# Patient Record
Sex: MALE | Race: WHITE | Employment: FULL TIME | URBAN - METROPOLITAN AREA
[De-identification: names, ages, dates, MRNs, and addresses within clinical notes are randomized per-mention and may not be internally consistent; named-entity substitution may affect disease eponyms.]

---

## 2021-06-17 ENCOUNTER — HOSPITAL ENCOUNTER (EMERGENCY)
Facility: CLINIC | Age: 41
Discharge: HOME | End: 2021-06-17
Attending: EMERGENCY MEDICINE
Payer: COMMERCIAL

## 2021-06-17 ENCOUNTER — APPOINTMENT (OUTPATIENT)
Dept: CT IMAGING | Facility: CLINIC | Age: 41
End: 2021-06-17
Payer: COMMERCIAL

## 2021-06-17 VITALS
RESPIRATION RATE: 18 BRPM | HEART RATE: 68 BPM | SYSTOLIC BLOOD PRESSURE: 143 MMHG | DIASTOLIC BLOOD PRESSURE: 104 MMHG | OXYGEN SATURATION: 98 % | TEMPERATURE: 98.3 F

## 2021-06-17 DIAGNOSIS — S09.90XA CLOSED HEAD INJURY, INITIAL ENCOUNTER: Primary | ICD-10-CM

## 2021-06-17 PROCEDURE — 70450 CT HEAD/BRAIN W/O DYE: CPT

## 2021-06-17 PROCEDURE — 99283 EMERGENCY DEPT VISIT LOW MDM: CPT

## 2021-06-17 PROCEDURE — 96372 THER/PROPH/DIAG INJ SC/IM: CPT

## 2021-06-17 PROCEDURE — 6360000002 HC RX W HCPCS: Performed by: EMERGENCY MEDICINE

## 2021-06-17 RX ORDER — KETOROLAC TROMETHAMINE 30 MG/ML
30 INJECTION, SOLUTION INTRAMUSCULAR; INTRAVENOUS ONCE
Status: COMPLETED | OUTPATIENT
Start: 2021-06-17 | End: 2021-06-17

## 2021-06-17 RX ADMIN — KETOROLAC TROMETHAMINE 30 MG: 30 INJECTION, SOLUTION INTRAMUSCULAR at 13:31

## 2021-06-17 ASSESSMENT — ENCOUNTER SYMPTOMS
DIARRHEA: 0
SORE THROAT: 0
VOMITING: 0
SHORTNESS OF BREATH: 0

## 2021-06-17 ASSESSMENT — PAIN SCALES - GENERAL
PAINLEVEL_OUTOF10: 8
PAINLEVEL_OUTOF10: 8

## 2021-06-17 ASSESSMENT — PAIN DESCRIPTION - LOCATION: LOCATION: HEAD

## 2021-06-17 ASSESSMENT — PAIN DESCRIPTION - PAIN TYPE: TYPE: ACUTE PAIN
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
## 2025-05-20 ENCOUNTER — OFFICE VISIT (OUTPATIENT)
Age: 45
End: 2025-05-20

## 2025-05-20 VITALS
DIASTOLIC BLOOD PRESSURE: 79 MMHG | OXYGEN SATURATION: 97 % | WEIGHT: 170 LBS | SYSTOLIC BLOOD PRESSURE: 129 MMHG | RESPIRATION RATE: 14 BRPM | TEMPERATURE: 98.4 F | HEART RATE: 98 BPM | HEIGHT: 69 IN | BODY MASS INDEX: 25.18 KG/M2

## 2025-05-20 DIAGNOSIS — M76.892 TENDINITIS OF LEFT KNEE: ICD-10-CM

## 2025-05-20 DIAGNOSIS — S83.92XA SPRAIN OF LEFT KNEE, UNSPECIFIED LIGAMENT, INITIAL ENCOUNTER: Primary | ICD-10-CM

## 2025-05-20 RX ORDER — IBUPROFEN 600 MG/1
600 TABLET, FILM COATED ORAL 3 TIMES DAILY PRN
Qty: 30 TABLET | Refills: 0 | Status: SHIPPED | OUTPATIENT
Start: 2025-05-20

## 2025-05-20 RX ORDER — NAPROXEN 500 MG/1
500 TABLET ORAL 2 TIMES DAILY WITH MEALS
COMMUNITY
Start: 2025-03-26

## 2025-05-20 RX ORDER — LISINOPRIL 5 MG/1
5 TABLET ORAL DAILY
COMMUNITY
Start: 2025-03-26

## 2025-05-20 RX ORDER — CETIRIZINE HYDROCHLORIDE 10 MG/1
10 TABLET, CHEWABLE ORAL DAILY
COMMUNITY

## 2025-05-20 ASSESSMENT — ENCOUNTER SYMPTOMS
NAUSEA: 0
TROUBLE SWALLOWING: 0
EYE DISCHARGE: 0
FACIAL SWELLING: 0
EYE PAIN: 0
COUGH: 0
VOMITING: 0
SORE THROAT: 0
ABDOMINAL PAIN: 0
EYE REDNESS: 0
ABDOMINAL DISTENTION: 0
CHEST TIGHTNESS: 0
SHORTNESS OF BREATH: 0
VOICE CHANGE: 0
CONSTIPATION: 0
DIARRHEA: 0
BACK PAIN: 0
COLOR CHANGE: 0

## 2025-05-20 NOTE — PROGRESS NOTES
3 times daily    ibuprofen (ADVIL;MOTRIN) 600 MG tablet     Sig: Take 1 tablet by mouth 3 times daily as needed for Pain     Dispense:  30 tablet     Refill:  0            Medical Decision Making     Left knee sprain versus tendinitis.  X-ray is not warranted based on physical exam.  Home on ibuprofen.  R-I-C-E therapy.\"Based on the patient's current clinical status and physical examination findings, the patient is stable and can be safely discharged with prescribed medications and close outpatient follow-up.\"I Discussed physical exam findings , diagnosis, plan of care, and follow-up at length and in details with patient.  I Reviewed prescribed and recommended medications, administration, and side effects.         Discharge instructions:     - R-I-C-E therapy   - Rest. Advance activities and diet as tolerated.  - Take the prescribed medications as instructed.  - Follow up with your PCP as instructed.   - Return to the Urgent care or go to the closest ER if your symptoms persist, worsen or any other concerns. Patient verbalized understanding.    An electronic signature was used to authenticate this note.    --PEGGY Zavala - CNP

## 2025-06-11 ENCOUNTER — APPOINTMENT (OUTPATIENT)
Dept: CT IMAGING | Age: 45
End: 2025-06-11
Payer: COMMERCIAL

## 2025-06-11 ENCOUNTER — HOSPITAL ENCOUNTER (EMERGENCY)
Age: 45
Discharge: HOME | End: 2025-06-11
Attending: EMERGENCY MEDICINE
Payer: COMMERCIAL

## 2025-06-11 VITALS
TEMPERATURE: 98.1 F | SYSTOLIC BLOOD PRESSURE: 115 MMHG | RESPIRATION RATE: 16 BRPM | OXYGEN SATURATION: 97 % | DIASTOLIC BLOOD PRESSURE: 83 MMHG | HEART RATE: 61 BPM

## 2025-06-11 DIAGNOSIS — R19.7 NAUSEA VOMITING AND DIARRHEA: Primary | ICD-10-CM

## 2025-06-11 DIAGNOSIS — R11.2 NAUSEA VOMITING AND DIARRHEA: Primary | ICD-10-CM

## 2025-06-11 LAB
ALBUMIN SERPL-MCNC: 4 G/DL (ref 3.5–5.2)
ALBUMIN/GLOB SERPL: 1.7 {RATIO} (ref 1–2.5)
ALP SERPL-CCNC: 71 U/L (ref 40–129)
ALT SERPL-CCNC: 23 U/L (ref 10–50)
ANION GAP SERPL CALCULATED.3IONS-SCNC: 13 MMOL/L (ref 9–16)
AST SERPL-CCNC: 24 U/L (ref 10–50)
BASOPHILS # BLD: 0.03 K/UL (ref 0–0.2)
BASOPHILS NFR BLD: 0 % (ref 0–2)
BILIRUB SERPL-MCNC: 0.9 MG/DL (ref 0–1.2)
BUN SERPL-MCNC: 14 MG/DL (ref 6–20)
CALCIUM SERPL-MCNC: 9.1 MG/DL (ref 8.6–10.4)
CHLORIDE SERPL-SCNC: 99 MMOL/L (ref 98–107)
CO2 SERPL-SCNC: 23 MMOL/L (ref 20–31)
CREAT SERPL-MCNC: 0.9 MG/DL (ref 0.7–1.2)
EOSINOPHIL # BLD: <0.03 K/UL (ref 0–0.44)
EOSINOPHILS RELATIVE PERCENT: 0 % (ref 1–4)
ERYTHROCYTE [DISTWIDTH] IN BLOOD BY AUTOMATED COUNT: 12.6 % (ref 11.8–14.4)
GFR, ESTIMATED: >90 ML/MIN/1.73M2
GLUCOSE SERPL-MCNC: 106 MG/DL (ref 74–99)
HCT VFR BLD AUTO: 39.6 % (ref 40.7–50.3)
HGB BLD-MCNC: 14.3 G/DL (ref 13–17)
IMM GRANULOCYTES # BLD AUTO: 0.03 K/UL (ref 0–0.3)
IMM GRANULOCYTES NFR BLD: 0 %
LACTIC ACID, WHOLE BLOOD: 1.3 MMOL/L (ref 0.7–2.1)
LIPASE SERPL-CCNC: 34 U/L (ref 13–60)
LYMPHOCYTES NFR BLD: 1.17 K/UL (ref 1.1–3.7)
LYMPHOCYTES RELATIVE PERCENT: 15 % (ref 24–43)
MAGNESIUM SERPL-MCNC: 2.1 MG/DL (ref 1.6–2.6)
MCH RBC QN AUTO: 31.8 PG (ref 25.2–33.5)
MCHC RBC AUTO-ENTMCNC: 36.1 G/DL (ref 28.4–34.8)
MCV RBC AUTO: 88.2 FL (ref 82.6–102.9)
MONOCYTES NFR BLD: 0.75 K/UL (ref 0.1–1.2)
MONOCYTES NFR BLD: 10 % (ref 3–12)
NEUTROPHILS NFR BLD: 75 % (ref 36–65)
NEUTS SEG NFR BLD: 5.87 K/UL (ref 1.5–8.1)
NRBC BLD-RTO: 0 PER 100 WBC
PLATELET # BLD AUTO: 201 K/UL (ref 138–453)
PMV BLD AUTO: 11.1 FL (ref 8.1–13.5)
POTASSIUM SERPL-SCNC: 3.7 MMOL/L (ref 3.7–5.3)
PROT SERPL-MCNC: 6.4 G/DL (ref 6.6–8.7)
RBC # BLD AUTO: 4.49 M/UL (ref 4.21–5.77)
SODIUM SERPL-SCNC: 135 MMOL/L (ref 136–145)
WBC OTHER # BLD: 7.9 K/UL (ref 3.5–11.3)

## 2025-06-11 PROCEDURE — 85025 COMPLETE CBC W/AUTO DIFF WBC: CPT

## 2025-06-11 PROCEDURE — 6360000002 HC RX W HCPCS: Performed by: EMERGENCY MEDICINE

## 2025-06-11 PROCEDURE — 6360000004 HC RX CONTRAST MEDICATION: Performed by: STUDENT IN AN ORGANIZED HEALTH CARE EDUCATION/TRAINING PROGRAM

## 2025-06-11 PROCEDURE — 2580000003 HC RX 258: Performed by: STUDENT IN AN ORGANIZED HEALTH CARE EDUCATION/TRAINING PROGRAM

## 2025-06-11 PROCEDURE — 2500000003 HC RX 250 WO HCPCS: Performed by: EMERGENCY MEDICINE

## 2025-06-11 PROCEDURE — 6360000002 HC RX W HCPCS: Performed by: STUDENT IN AN ORGANIZED HEALTH CARE EDUCATION/TRAINING PROGRAM

## 2025-06-11 PROCEDURE — 83735 ASSAY OF MAGNESIUM: CPT

## 2025-06-11 PROCEDURE — 96374 THER/PROPH/DIAG INJ IV PUSH: CPT

## 2025-06-11 PROCEDURE — 96375 TX/PRO/DX INJ NEW DRUG ADDON: CPT

## 2025-06-11 PROCEDURE — 96376 TX/PRO/DX INJ SAME DRUG ADON: CPT

## 2025-06-11 PROCEDURE — 99281 EMR DPT VST MAYX REQ PHY/QHP: CPT | Performed by: PHYSICIAN ASSISTANT

## 2025-06-11 PROCEDURE — 83605 ASSAY OF LACTIC ACID: CPT

## 2025-06-11 PROCEDURE — 74177 CT ABD & PELVIS W/CONTRAST: CPT

## 2025-06-11 PROCEDURE — 99285 EMERGENCY DEPT VISIT HI MDM: CPT

## 2025-06-11 PROCEDURE — 83690 ASSAY OF LIPASE: CPT

## 2025-06-11 PROCEDURE — 80053 COMPREHEN METABOLIC PANEL: CPT

## 2025-06-11 PROCEDURE — 6370000000 HC RX 637 (ALT 250 FOR IP): Performed by: EMERGENCY MEDICINE

## 2025-06-11 RX ORDER — MAGNESIUM HYDROXIDE/ALUMINUM HYDROXICE/SIMETHICONE 120; 1200; 1200 MG/30ML; MG/30ML; MG/30ML
30 SUSPENSION ORAL ONCE
Status: COMPLETED | OUTPATIENT
Start: 2025-06-11 | End: 2025-06-11

## 2025-06-11 RX ORDER — DIPHENHYDRAMINE HYDROCHLORIDE 50 MG/ML
25 INJECTION, SOLUTION INTRAMUSCULAR; INTRAVENOUS ONCE
Status: COMPLETED | OUTPATIENT
Start: 2025-06-11 | End: 2025-06-11

## 2025-06-11 RX ORDER — 0.9 % SODIUM CHLORIDE 0.9 %
1000 INTRAVENOUS SOLUTION INTRAVENOUS ONCE
Status: COMPLETED | OUTPATIENT
Start: 2025-06-11 | End: 2025-06-11

## 2025-06-11 RX ORDER — MORPHINE SULFATE 4 MG/ML
4 INJECTION INTRAVENOUS ONCE
Refills: 0 | Status: COMPLETED | OUTPATIENT
Start: 2025-06-11 | End: 2025-06-11

## 2025-06-11 RX ORDER — ONDANSETRON 4 MG/1
4 TABLET, ORALLY DISINTEGRATING ORAL 3 TIMES DAILY PRN
Qty: 6 TABLET | Refills: 0 | Status: SHIPPED | OUTPATIENT
Start: 2025-06-11

## 2025-06-11 RX ORDER — PROCHLORPERAZINE EDISYLATE 5 MG/ML
10 INJECTION INTRAMUSCULAR; INTRAVENOUS ONCE
Status: COMPLETED | OUTPATIENT
Start: 2025-06-11 | End: 2025-06-11

## 2025-06-11 RX ORDER — IOPAMIDOL 755 MG/ML
75 INJECTION, SOLUTION INTRAVASCULAR
Status: COMPLETED | OUTPATIENT
Start: 2025-06-11 | End: 2025-06-11

## 2025-06-11 RX ORDER — POLYETHYLENE GLYCOL 3350 17 G/17G
17 POWDER, FOR SOLUTION ORAL DAILY PRN
Qty: 10 PACKET | Refills: 0 | Status: SHIPPED | OUTPATIENT
Start: 2025-06-11 | End: 2025-06-21

## 2025-06-11 RX ORDER — MORPHINE SULFATE 4 MG/ML
4 INJECTION INTRAVENOUS ONCE
Status: COMPLETED | OUTPATIENT
Start: 2025-06-11 | End: 2025-06-11

## 2025-06-11 RX ADMIN — SODIUM CHLORIDE 1000 ML: 0.9 INJECTION, SOLUTION INTRAVENOUS at 13:35

## 2025-06-11 RX ADMIN — MORPHINE SULFATE 4 MG: 4 INJECTION INTRAVENOUS at 22:18

## 2025-06-11 RX ADMIN — IOPAMIDOL 75 ML: 755 INJECTION, SOLUTION INTRAVENOUS at 16:24

## 2025-06-11 RX ADMIN — ALUMINUM HYDROXIDE, MAGNESIUM HYDROXIDE, AND SIMETHICONE 30 ML: 200; 200; 20 SUSPENSION ORAL at 18:19

## 2025-06-11 RX ADMIN — DIPHENHYDRAMINE HYDROCHLORIDE 25 MG: 50 INJECTION INTRAMUSCULAR; INTRAVENOUS at 18:24

## 2025-06-11 RX ADMIN — PROCHLORPERAZINE EDISYLATE 10 MG: 5 INJECTION INTRAMUSCULAR; INTRAVENOUS at 18:25

## 2025-06-11 RX ADMIN — MORPHINE SULFATE 4 MG: 4 INJECTION INTRAVENOUS at 13:34

## 2025-06-11 RX ADMIN — FAMOTIDINE 20 MG: 10 INJECTION, SOLUTION INTRAVENOUS at 18:25

## 2025-06-11 ASSESSMENT — PAIN DESCRIPTION - LOCATION: LOCATION: ABDOMEN

## 2025-06-11 ASSESSMENT — PAIN SCALES - GENERAL: PAINLEVEL_OUTOF10: 7

## 2025-06-11 NOTE — ED PROVIDER NOTES
[facility]     Emergency Department     Faculty Attestation  2:03 PM EDT      I performed a history and physical examination of the patient and discussed management with the resident. I have reviewed and agree with the resident’s findings including all diagnostic interpretations, and treatment plans as written. Any areas of disagreement are noted on the chart. I was personally present for the key portions of any procedures. I have documented in the chart those procedures where I was not present during the key portions. I have reviewed the emergency nurses triage note. I agree with the chief complaint, past medical history, past surgical history, allergies, medications, social and family history as documented unless otherwise noted below. Documentation of the HPI, Physical Exam and Medical Decision Making performed by scribbelem is based on my personal performance of the HPI, PE and MDM. For Physician Assistant/ Nurse Practitioner cases/documentation I have personally evaluated this patient and have completed at least one if not all key elements of the E/M (history, physical exam, and MDM). Additional findings are as noted.    Abdominal pain, reports did not have a bowel movement for 2 days, and then has been having vomiting, and diarrhea was seen at urgent care a few days ago did get Zofran and some fluids.  But continues to have abdominal pain and cramping.  On exam he is diffusely tender to his abdomen he does have prior umbilical hernia repair about 20 years ago,    Plan for basic labs, CT imaging, rule out obstruction.    Shanelle Barr D.O, M.P.H  Attending Emergency Medicine Physician         Shanelle Barr, DO  06/11/25 1419       Shanelle Barr, DO  06/11/25 1425

## 2025-06-11 NOTE — ED PROVIDER NOTES
Handoff taken on the following patient from prior Attending Physician:    6:05 PM EDT    Pt Name: Pascual Metz    PCP:  Torin Taylor APRN - CNP         Attestation    I was available and discussed any additional care issues that arose and coordinated the management plans with the resident(s) caring for the patient during my duty period. Any areas of disagreement with resident’s documentation of care or procedures are noted on the chart. I was personally present for the key portions of any/all procedures during my duty period. I have documented in the chart those procedures where I was not present during the key portions.      Patient is a 45-year-old male with nausea vomiting diarrhea and abdominal pain CT abdomen pelvis currently pending however due to multiple patient is awaiting CT there is significant delay for the patient's scan at this time.     Doron Munroe DO  06/11/25 2232

## 2025-06-11 NOTE — ED PROVIDER NOTES
TECHNIQUE: CT of the abdomen and pelvis was performed with the administration of intravenous contrast. Multiplanar reformatted images are provided for review. Automated exposure control, iterative reconstruction, and/or weight based adjustment of the mA/kV was utilized to reduce the radiation dose to as low as reasonably achievable. COMPARISON: None. HISTORY: ORDERING SYSTEM PROVIDED HISTORY: hx obstruction; n/v/d, pain; ro acute process TECHNOLOGIST PROVIDED HISTORY: hx obstruction; n/v/d, pain; ro acute process Decision Support Exception - unselect if not a suspected or confirmed emergency medical condition->Emergency Medical Condition (MA) Reason for Exam: hx obstruction; n/v/d, pain; ro acute process FINDINGS: Lower Chest: Base of lungs are clear.  No pleural effusion or pleural thickening.  No hiatal hernia.  No pneumoperitoneum. Organs: No definable focal abnormality or acute process in liver.  Liver measures 17 cm in craniocaudad dimension. Gallbladder is mildly distended but without stone and without any inflammatory change. Spleen is of generous size measuring 10.5 cm craniocaudad and 12 cm AP. There is no focal abnormality or acute process in spleen. Normal pancreas. Normal bilateral adrenal glands. In the left kidney, there is no evidence of mass, stone or hydronephrosis. In the posterior portion of right mid kidney there is nonenhancing cortical cyst measuring 1.4 cm in diameter.  No recommendation for further follow-up imaging of renal cyst.  No evidence of stone or hydronephrosis in the right kidney. No evidence of ureteric stone or obstruction in either side. No evidence of stone or acute process in the bladder. Prostate is grossly normal with a diameter of 4.1 cm transversely. GI/Bowel: No diagnostic finding in the visualized stomach.  No obvious hiatal hernia.  Small to moderate amount of nonspecific gas scattered in some loops of small bowel in the right side of abdomen without abnormal fluid  discharge. [SD]      ED Course User Index  [SD] Yvette Kang MD  [WK] Doron Munroe DO       OUTSTANDING TASKS / RECOMMENDATIONS:    Follow-up CT scan  Reassessed  Dispo     FINAL IMPRESSION:     1. Nausea vomiting and diarrhea        DISPOSITION:         DISPOSITION:  [x]  Discharge   []  Transfer -    []  Admission -     []  Against Medical Advice   []  Eloped   FOLLOW-UP: Torin Taylor, APRN - CNP  [address] [facility] [address]  [phone]    Schedule an appointment as soon as possible for a visit in 2 days      Katia Thurman MD  [address] [facility] [address]  [phone]    Schedule an appointment as soon as possible for a visit in 2 days      [facility] Emergency Department  [address] [place] [address]  [phone]    As needed, If symptoms worsen     DISCHARGE MEDICATIONS: Discharge Medication List as of 6/11/2025 10:12 PM        START taking these medications    Details   polyethylene glycol (MIRALAX) 17 g packet Take 1 packet by mouth daily as needed for Constipation, Disp-10 packet, R-0Print      ondansetron (ZOFRAN-ODT) 4 MG disintegrating tablet Take 1 tablet by mouth 3 times daily as needed for Nausea or Vomiting, Disp-6 tablet, R-0Print                Yvette Kang MD  Emergency Medicine Resident  [facility]

## 2025-06-11 NOTE — ED NOTES
Pt arrived to ER with complaints of abdominal pain, nausea and diarrhea since Sunday when he came to ER prior but still having same symptoms so returned to hospital. Non labored breathing, no chest pain, alert x4 and ambulating. Call light in hand.

## 2025-06-11 NOTE — ED PROVIDER NOTES
ATTENDING  ADDENDUM     Care of this patient was assumed from Dr. Barr.  The patient was seen for Nausea, Vomiting, and Diarrhea (X 4 days)  .  The patient's initial evaluation and plan have been discussed with the prior provider who initially evaluated the patient.  Nursing Notes, Past Medical Hx, Past Surgical Hx, Social Hx, Allergies, and Family Hx were all reviewed.      ED COURSE      The patient was given the following medications:  Orders Placed This Encounter   Medications    sodium chloride 0.9 % bolus 1,000 mL    morphine injection 4 mg       RECENT VITALS:   Temp: 98.1 °F (36.7 °C), Pulse: 61, Respirations: 16, BP: (!) 153/99    MEDICAL DECISION MAKING       2:35 PM EDT  Patient here with nausea, vomiting, and diarrhea with abdominal pain. CT pending.       Laurie Douglas MD  Emergency Medicine Attending        Laurie Douglas MD  06/11/25 1603

## 2025-06-11 NOTE — ED PROVIDER NOTES
[facility] EMERGENCY DEPARTMENT  Emergency Department Encounter  Emergency Medicine Resident     Pt Name:Pascual Metz  MRN: [id number]  Birthdate 1980  Date of evaluation: 25  PCP:  Torin Taylor APRN - CNP  Note Started: 1:25 PM EDT      CHIEF COMPLAINT       Chief Complaint   Patient presents with    Nausea    Vomiting    Diarrhea     X 4 days       HISTORY OF PRESENT ILLNESS  (Location/Symptom, Timing/Onset, Context/Setting, Quality, Duration, Modifying Factors, Severity.)      Pascual Metz is a 45 y.o. male past medical history of bowel obstruction and umbilical hernia repair, presenting for assessment of nausea vomiting and diarrhea.  Symptom onset 4 days ago.  Patient reports an innumerable bout of nonbloody emesis and diarrhea daily.  He states that the diarrhea has slowly improved but is still present.  States he was seen in an urgent care 2 days ago.  Was given Zofran which has helped somewhat with the nausea.  No other sick contacts.  No recent travel.  Denies any heavy alcohol use or marijuana use.    PAST MEDICAL / SURGICAL / SOCIAL / FAMILY HISTORY      has a past medical history of Umbilical hernia.       has a past surgical history that includes Umbilical hernia repair.      Social History     Socioeconomic History    Marital status: Single     Spouse name: Not on file    Number of children: Not on file    Years of education: Not on file    Highest education level: Not on file   Occupational History    Not on file   Tobacco Use    Smoking status: Former     Current packs/day: 0.00     Types: Cigarettes     Quit date: 2000     Years since quittin.5    Smokeless tobacco: Not on file   Substance and Sexual Activity    Alcohol use: Yes     Comment: Socail     Drug use: Yes     Types: Marijuana (Weed)    Sexual activity: Not on file   Other Topics Concern    Not on file   Social History Narrative    Not on file     Social Drivers of Health     Financial  largely unremarkable.  There is some slight drying of the mucous membranes plan to obtain routine labs.  Administer supportive care.  Will reassess and consider diagnostic imaging if no improvement.    3:41 PM  Labs reviewed  CMP, lipase, magnesium, CBC, lactic acid are unremarkable.    Given the patient's degree of discomfort in the abdomen, will obtain CT abdomen pelvis.    Pending CT results, if negative, patient will require oral challenge prior to disposition.    Patient's care be handed over to an oncoming resident.         PROCEDURES:       CONSULTS:  None    CRITICAL CARE:  There was significant risk of life threatening deterioration of patient's condition requiring my direct management. Critical care time   minutes, excluding any documented procedures.    FINAL IMPRESSION      1. Nausea vomiting and diarrhea          DISPOSITION / PLAN     DISPOSITION                 PATIENT REFERRED TO:  No follow-up provider specified.    DISCHARGE MEDICATIONS:  New Prescriptions    No medications on file       Jorge Grimaldo MD  Emergency Medicine Resident    (Please note that portions of this note were completed with a voice recognition program.  Efforts were made to edit the dictations but occasionally words are mis-transcribed.)

## 2025-06-12 NOTE — CONSULTS
hydronephrosis. In the posterior portion of right mid kidney there is nonenhancing cortical cyst measuring 1.4 cm in diameter.  No recommendation for further follow-up imaging of renal cyst.  No evidence of stone or hydronephrosis in the right kidney. No evidence of ureteric stone or obstruction in either side. No evidence of stone or acute process in the bladder. Prostate is grossly normal with a diameter of 4.1 cm transversely. GI/Bowel: No diagnostic finding in the visualized stomach.  No obvious hiatal hernia.  Small to moderate amount of nonspecific gas scattered in some loops of small bowel in the right side of abdomen without abnormal fluid levels. No localized inflammatory process in this area.  There is no focal thickening of small bowel.  Normal terminal ileum. There is normal appendix inferolateral to cecum. Evidence of small amount of stool and gas in cecum.  Moderate amount of stool present in the ascending colon.  Moderate amount of stool present in the hepatic flexure of colon and transverse colon with small amount of scattered gas.  In the descending colon small amount of stool and gas are present. Sigmoid colon and rectum contains small amount of stool and gas. Evidence of mild-to-moderate diverticulosis of proximal and mid sigmoid colon and the descending colon without evidence of diverticulitis.  No evidence of colitis.  No pericolic inflammatory change. No perirectal abnormality. Pelvis: No localized abnormal fluid collection or inflammatory process in pelvis.  Evidence of small to moderate left-sided hydrocele and small right-sided hydrocele noted. Peritoneum/Retroperitoneum: No intraperitoneal or retroperitoneal hemorrhage. No ascites.  No periaortic or mesenteric pathologic lymphadenopathy. Abdominal aorta is of normal size without dissection.  All mesenteric arteries and bilateral renal arteries and their major branches are normally patent and opacified. Bones/Soft Tissues: At L5-S1 level  there are findings of moderate to marked degenerative disc disease without significant facet osteoarthritis.  No significant disc bulge.  No central stenosis.  Mild-to-moderate neural foraminal stenosis bilaterally. At L4-L5 level, moderate degenerative disc disease.  Posterior central broad-based disc protrusion which is mildly skewed to the left.  Mild central stenosis and mild left lateral recess stenosis.  No significant neural foraminal stenosis. No evidence of fracture or osseous malalignment the lumbar spine, pelvic bones and joints and bilateral hip joints. No evidence of inguinal, femoral or ventral hernia.     1. Diverticulosis of the descending and sigmoid colon without evidence of diverticulitis. 2. In the right lower abdomen some loops of small bowel contain small to moderate amount of gas with a few minimal fluid level without localized inflammatory process and without bowel wall thickening.  The finding is nonspecific.  Rest of small bowel appears to be normal without gas. Follow-up abdominal radiographs according to clinical assessment, suggested. 3. Normal appendix. 4. No evidence of obstructive uropathy. 5. Bilateral hydroceles, left greater than right.  For further evaluation, scrotal ultrasound may be considered. 6. Significant degenerative disc disease at L4-L5 and L5-S1 with mild central stenosis and mild left lateral recess stenosis at L4-L5.      Thank you for the interesting evaluation. Further recommendations to follow.    Nasrin Chavez PA-C  6/11/2025, 8:30 PM

## 2025-06-12 NOTE — DISCHARGE INSTRUCTIONS
You were seen here for nausea, vomiting, abdominal pain.  You were given a prescription for MiraLAX as well as Zofran.  Take the Zofran as needed for nausea and vomiting.  Take the MiraLAX for constipation.    You need to call and schedule follow-up appointment with a primary care doctor for soon as possible.  You also need to call and schedule follow-up appointment with a GI doctor for soon as possible.    Return to the emergency department immediately if you experience worsening symptoms, develop any other symptoms, or if you have any other concerns.